# Patient Record
Sex: FEMALE | Race: WHITE | NOT HISPANIC OR LATINO | Employment: OTHER | URBAN - METROPOLITAN AREA
[De-identification: names, ages, dates, MRNs, and addresses within clinical notes are randomized per-mention and may not be internally consistent; named-entity substitution may affect disease eponyms.]

---

## 2017-01-18 ENCOUNTER — FOLLOW UP (OUTPATIENT)
Dept: URBAN - METROPOLITAN AREA CLINIC 26 | Facility: CLINIC | Age: 72
End: 2017-01-18

## 2017-01-18 VITALS — DIASTOLIC BLOOD PRESSURE: 77 MMHG | HEART RATE: 62 BPM | SYSTOLIC BLOOD PRESSURE: 138 MMHG | HEIGHT: 55 IN

## 2017-01-18 DIAGNOSIS — E10.9: ICD-10-CM

## 2017-01-18 DIAGNOSIS — H35.3132: ICD-10-CM

## 2017-01-18 DIAGNOSIS — H02.833: ICD-10-CM

## 2017-01-18 DIAGNOSIS — B58.01: ICD-10-CM

## 2017-01-18 DIAGNOSIS — H43.393: ICD-10-CM

## 2017-01-18 DIAGNOSIS — H02.836: ICD-10-CM

## 2017-01-18 DIAGNOSIS — H04.123: ICD-10-CM

## 2017-01-18 DIAGNOSIS — H31.013: ICD-10-CM

## 2017-01-18 DIAGNOSIS — H35.373: ICD-10-CM

## 2017-01-18 PROCEDURE — 2022F DILAT RTA XM EVC RTNOPTHY: CPT

## 2017-01-18 PROCEDURE — 2019F DILATED MACUL EXAM DONE: CPT

## 2017-01-18 PROCEDURE — 92014 COMPRE OPH EXAM EST PT 1/>: CPT

## 2017-01-18 PROCEDURE — 92250 FUNDUS PHOTOGRAPHY W/I&R: CPT

## 2017-01-18 PROCEDURE — 1036F TOBACCO NON-USER: CPT

## 2017-01-18 PROCEDURE — G8420 CALC BMI NORM PARAMETERS: HCPCS

## 2017-01-18 PROCEDURE — 4177F TALK PT/CRGVR RE AREDS PREV: CPT

## 2017-01-18 ASSESSMENT — TONOMETRY
OD_IOP_MMHG: 13
OS_IOP_MMHG: 14

## 2017-01-18 ASSESSMENT — VISUAL ACUITY
OS_SC: 20/30-2
OD_PH: 20/70
OD_SC: 20/200

## 2018-01-09 ENCOUNTER — FOLLOW UP (OUTPATIENT)
Dept: URBAN - METROPOLITAN AREA CLINIC 26 | Facility: CLINIC | Age: 73
End: 2018-01-09

## 2018-01-09 VITALS — DIASTOLIC BLOOD PRESSURE: 67 MMHG | HEART RATE: 70 BPM | SYSTOLIC BLOOD PRESSURE: 129 MMHG | HEIGHT: 55 IN

## 2018-01-09 DIAGNOSIS — H04.123: ICD-10-CM

## 2018-01-09 DIAGNOSIS — H02.833: ICD-10-CM

## 2018-01-09 DIAGNOSIS — H43.393: ICD-10-CM

## 2018-01-09 DIAGNOSIS — E10.9: ICD-10-CM

## 2018-01-09 DIAGNOSIS — B58.01: ICD-10-CM

## 2018-01-09 DIAGNOSIS — H35.373: ICD-10-CM

## 2018-01-09 DIAGNOSIS — H35.3132: ICD-10-CM

## 2018-01-09 DIAGNOSIS — H02.836: ICD-10-CM

## 2018-01-09 DIAGNOSIS — H31.013: ICD-10-CM

## 2018-01-09 PROCEDURE — 92014 COMPRE OPH EXAM EST PT 1/>: CPT

## 2018-01-09 PROCEDURE — 92134 CPTRZ OPH DX IMG PST SGM RTA: CPT

## 2018-01-09 ASSESSMENT — TONOMETRY
OS_IOP_MMHG: 16
OD_IOP_MMHG: 12

## 2018-01-09 ASSESSMENT — VISUAL ACUITY
OD_CC: 20/30
OS_CC: 20/20

## 2019-01-14 ENCOUNTER — FOLLOW UP (OUTPATIENT)
Dept: URBAN - METROPOLITAN AREA CLINIC 26 | Facility: CLINIC | Age: 74
End: 2019-01-14

## 2019-01-14 VITALS
HEART RATE: 70 BPM | BODY MASS INDEX: 20.49 KG/M2 | WEIGHT: 120 LBS | DIASTOLIC BLOOD PRESSURE: 71 MMHG | SYSTOLIC BLOOD PRESSURE: 129 MMHG | HEIGHT: 64 IN

## 2019-01-14 DIAGNOSIS — H35.373: ICD-10-CM

## 2019-01-14 DIAGNOSIS — E10.9: ICD-10-CM

## 2019-01-14 DIAGNOSIS — B58.01: ICD-10-CM

## 2019-01-14 DIAGNOSIS — H31.013: ICD-10-CM

## 2019-01-14 DIAGNOSIS — H35.3132: ICD-10-CM

## 2019-01-14 DIAGNOSIS — H02.836: ICD-10-CM

## 2019-01-14 DIAGNOSIS — H04.123: ICD-10-CM

## 2019-01-14 DIAGNOSIS — H43.393: ICD-10-CM

## 2019-01-14 DIAGNOSIS — H02.833: ICD-10-CM

## 2019-01-14 PROCEDURE — 92014 COMPRE OPH EXAM EST PT 1/>: CPT

## 2019-01-14 PROCEDURE — 92250 FUNDUS PHOTOGRAPHY W/I&R: CPT

## 2019-01-14 PROCEDURE — 92134 CPTRZ OPH DX IMG PST SGM RTA: CPT

## 2019-01-14 RX ORDER — OLOPATADINE HYDROCHLORIDE 2 MG/ML: 1 SOLUTION OPHTHALMIC ONCE A DAY

## 2019-01-14 ASSESSMENT — VISUAL ACUITY
OD_CC: 20/50+2
OS_CC: 20/20-1

## 2019-01-14 ASSESSMENT — TONOMETRY
OS_IOP_MMHG: 15
OD_IOP_MMHG: 18

## 2019-12-04 ENCOUNTER — FOLLOW UP (OUTPATIENT)
Dept: URBAN - METROPOLITAN AREA CLINIC 26 | Facility: CLINIC | Age: 74
End: 2019-12-04

## 2019-12-04 DIAGNOSIS — H35.373: ICD-10-CM

## 2019-12-04 DIAGNOSIS — H43.393: ICD-10-CM

## 2019-12-04 DIAGNOSIS — B58.01: ICD-10-CM

## 2019-12-04 DIAGNOSIS — H04.123: ICD-10-CM

## 2019-12-04 DIAGNOSIS — H02.836: ICD-10-CM

## 2019-12-04 DIAGNOSIS — H02.833: ICD-10-CM

## 2019-12-04 DIAGNOSIS — H35.3132: ICD-10-CM

## 2019-12-04 DIAGNOSIS — H31.013: ICD-10-CM

## 2019-12-04 DIAGNOSIS — E10.9: ICD-10-CM

## 2019-12-04 PROCEDURE — 92250 FUNDUS PHOTOGRAPHY W/I&R: CPT

## 2019-12-04 PROCEDURE — 92014 COMPRE OPH EXAM EST PT 1/>: CPT

## 2019-12-04 PROCEDURE — 92134 CPTRZ OPH DX IMG PST SGM RTA: CPT

## 2019-12-04 ASSESSMENT — TONOMETRY
OS_IOP_MMHG: 16
OD_IOP_MMHG: 15

## 2019-12-04 ASSESSMENT — VISUAL ACUITY
OD_CC: 20/30-1
OS_CC: 20/25+2

## 2020-10-19 ENCOUNTER — FOLLOW UP (OUTPATIENT)
Dept: URBAN - METROPOLITAN AREA CLINIC 26 | Facility: CLINIC | Age: 75
End: 2020-10-19

## 2020-10-19 VITALS — HEIGHT: 64 IN | WEIGHT: 127 LBS | BODY MASS INDEX: 21.68 KG/M2

## 2020-10-19 DIAGNOSIS — H31.013: ICD-10-CM

## 2020-10-19 DIAGNOSIS — H02.833: ICD-10-CM

## 2020-10-19 DIAGNOSIS — H02.836: ICD-10-CM

## 2020-10-19 DIAGNOSIS — H35.3132: ICD-10-CM

## 2020-10-19 DIAGNOSIS — H43.393: ICD-10-CM

## 2020-10-19 DIAGNOSIS — E10.3592: ICD-10-CM

## 2020-10-19 DIAGNOSIS — B58.01: ICD-10-CM

## 2020-10-19 DIAGNOSIS — H35.373: ICD-10-CM

## 2020-10-19 DIAGNOSIS — H04.123: ICD-10-CM

## 2020-10-19 PROCEDURE — 92250 FUNDUS PHOTOGRAPHY W/I&R: CPT

## 2020-10-19 PROCEDURE — 67028 INJECTION EYE DRUG: CPT

## 2020-10-19 PROCEDURE — 92134 CPTRZ OPH DX IMG PST SGM RTA: CPT

## 2020-10-19 PROCEDURE — 92014 COMPRE OPH EXAM EST PT 1/>: CPT

## 2020-10-19 ASSESSMENT — TONOMETRY
OS_IOP_MMHG: 15
OD_IOP_MMHG: 16

## 2020-10-19 ASSESSMENT — VISUAL ACUITY
OD_CC: 20/30
OS_CC: 20/20-2

## 2020-11-18 ENCOUNTER — CLINICAL PROCEDURE AND DIAGNOSTIC TESTING ONLY (OUTPATIENT)
Dept: URBAN - METROPOLITAN AREA CLINIC 26 | Facility: CLINIC | Age: 75
End: 2020-11-18

## 2020-11-18 DIAGNOSIS — H35.3132: ICD-10-CM

## 2020-11-18 DIAGNOSIS — E10.3592: ICD-10-CM

## 2020-11-18 PROCEDURE — 92250 FUNDUS PHOTOGRAPHY W/I&R: CPT

## 2020-11-18 PROCEDURE — 67028 INJECTION EYE DRUG: CPT

## 2020-11-18 ASSESSMENT — TONOMETRY
OS_IOP_MMHG: 13
OD_IOP_MMHG: 16

## 2020-11-18 ASSESSMENT — VISUAL ACUITY: OS_SC: 20/20-2

## 2020-12-30 ENCOUNTER — FOLLOW UP AND POST INJECTION EVALUATION (OUTPATIENT)
Dept: URBAN - METROPOLITAN AREA CLINIC 26 | Facility: CLINIC | Age: 75
End: 2020-12-30

## 2020-12-30 DIAGNOSIS — H35.373: ICD-10-CM

## 2020-12-30 DIAGNOSIS — B58.01: ICD-10-CM

## 2020-12-30 DIAGNOSIS — E10.3592: ICD-10-CM

## 2020-12-30 DIAGNOSIS — H31.013: ICD-10-CM

## 2020-12-30 DIAGNOSIS — H04.123: ICD-10-CM

## 2020-12-30 DIAGNOSIS — H02.833: ICD-10-CM

## 2020-12-30 DIAGNOSIS — H02.836: ICD-10-CM

## 2020-12-30 DIAGNOSIS — H35.3132: ICD-10-CM

## 2020-12-30 DIAGNOSIS — H43.393: ICD-10-CM

## 2020-12-30 PROCEDURE — 67028 INJECTION EYE DRUG: CPT

## 2020-12-30 PROCEDURE — 92250 FUNDUS PHOTOGRAPHY W/I&R: CPT

## 2020-12-30 PROCEDURE — 92134 CPTRZ OPH DX IMG PST SGM RTA: CPT

## 2020-12-30 PROCEDURE — 92014 COMPRE OPH EXAM EST PT 1/>: CPT

## 2020-12-30 ASSESSMENT — TONOMETRY
OS_IOP_MMHG: 14
OD_IOP_MMHG: 15

## 2020-12-30 ASSESSMENT — VISUAL ACUITY
OS_CC: 20/20
OD_CC: 20/40

## 2021-01-04 ENCOUNTER — CLINIC PROCEDURE ONLY (OUTPATIENT)
Dept: URBAN - METROPOLITAN AREA CLINIC 26 | Facility: CLINIC | Age: 76
End: 2021-01-04

## 2021-01-04 DIAGNOSIS — E10.3592: ICD-10-CM

## 2021-01-04 PROCEDURE — 67228 TREATMENT X10SV RETINOPATHY: CPT

## 2021-01-04 ASSESSMENT — TONOMETRY: OS_IOP_MMHG: 11

## 2021-01-04 ASSESSMENT — VISUAL ACUITY: OS_CC: 20/20-2

## 2021-01-05 ENCOUNTER — UNSCHEDULED FOLLOW UP (OUTPATIENT)
Dept: URBAN - METROPOLITAN AREA CLINIC 26 | Facility: CLINIC | Age: 76
End: 2021-01-05

## 2021-01-05 VITALS — WEIGHT: 128 LBS | HEIGHT: 64 IN | BODY MASS INDEX: 21.85 KG/M2

## 2021-01-05 DIAGNOSIS — B58.01: ICD-10-CM

## 2021-01-05 DIAGNOSIS — H35.3132: ICD-10-CM

## 2021-01-05 DIAGNOSIS — S05.02XA: ICD-10-CM

## 2021-01-05 DIAGNOSIS — H31.013: ICD-10-CM

## 2021-01-05 DIAGNOSIS — H35.373: ICD-10-CM

## 2021-01-05 DIAGNOSIS — E10.3592: ICD-10-CM

## 2021-01-05 PROCEDURE — 92071 CONTACT LENS FITTING FOR TX: CPT

## 2021-01-05 PROCEDURE — 92012 INTRM OPH EXAM EST PATIENT: CPT

## 2021-01-05 RX ORDER — OFLOXACIN 3 MG/ML: 1 SOLUTION/ DROPS OPHTHALMIC

## 2021-01-05 ASSESSMENT — TONOMETRY
OS_IOP_MMHG: 10
OD_IOP_MMHG: 15

## 2021-01-05 ASSESSMENT — VISUAL ACUITY
OS_SC: 20/25
OD_SC: 20/60-2

## 2021-01-07 ENCOUNTER — CLINIC PROCEDURE ONLY (OUTPATIENT)
Dept: URBAN - METROPOLITAN AREA CLINIC 26 | Facility: CLINIC | Age: 76
End: 2021-01-07

## 2021-01-07 DIAGNOSIS — H35.373: ICD-10-CM

## 2021-01-07 DIAGNOSIS — H35.3132: ICD-10-CM

## 2021-01-07 DIAGNOSIS — E10.3592: ICD-10-CM

## 2021-01-07 DIAGNOSIS — B58.01: ICD-10-CM

## 2021-01-07 DIAGNOSIS — H31.013: ICD-10-CM

## 2021-01-07 DIAGNOSIS — H43.393: ICD-10-CM

## 2021-01-07 DIAGNOSIS — S05.02XA: ICD-10-CM

## 2021-01-07 PROCEDURE — 92012 INTRM OPH EXAM EST PATIENT: CPT

## 2021-01-07 ASSESSMENT — VISUAL ACUITY: OS_CC: 20/20

## 2022-07-09 ENCOUNTER — TELEPHONE ENCOUNTER (OUTPATIENT)
Dept: URBAN - METROPOLITAN AREA CLINIC 121 | Facility: CLINIC | Age: 77
End: 2022-07-09

## 2022-07-10 ENCOUNTER — TELEPHONE ENCOUNTER (OUTPATIENT)
Dept: URBAN - METROPOLITAN AREA CLINIC 121 | Facility: CLINIC | Age: 77
End: 2022-07-10

## 2022-07-10 RX ORDER — HYDROCODONE BITARTRATE AND ACETAMINOPHEN 325; 10 MG/1; MG/1
TABLET ORAL
Refills: 0 | Status: ACTIVE | COMMUNITY
Start: 2013-01-31

## 2022-07-10 RX ORDER — LOVASTATIN 40 MG/1
TABLET ORAL
Refills: 0 | Status: ACTIVE | COMMUNITY
Start: 2013-01-31

## 2022-07-10 RX ORDER — GABAPENTIN 600 MG
TABLET ORAL
Refills: 0 | Status: ACTIVE | COMMUNITY
Start: 2013-01-31

## 2022-07-10 RX ORDER — CLOPIDOGREL BISULFATE 75 MG
TABLET ORAL
Refills: 0 | Status: ACTIVE | COMMUNITY
Start: 2013-01-31

## 2022-07-10 RX ORDER — UBIDECARENONE/VIT E ACET 100MG-5
CAPSULE ORAL
Refills: 0 | Status: ACTIVE | COMMUNITY
Start: 2013-01-31

## 2022-07-10 RX ORDER — LEVOTHYROXINE SODIUM 100 MCG
TABLET ORAL
Refills: 0 | Status: ACTIVE | COMMUNITY
Start: 2013-01-31

## 2022-07-10 RX ORDER — ATENOLOL 25 MG/1
TABLET ORAL
Refills: 0 | Status: ACTIVE | COMMUNITY
Start: 2013-01-31

## 2022-07-10 RX ORDER — ASPIRIN 81 MG/1
TABLET, DELAYED RELEASE ORAL
Refills: 0 | Status: ACTIVE | COMMUNITY
Start: 2013-01-31

## 2022-12-20 NOTE — PATIENT DISCUSSION
12/20/22-Recommend 100 units of Daxxify. Patient elects Daxxify injection. 0units discarded, 100 units used. (discussed risks and benefits of Daxy. Darrin Arroyo ). Lot# [id number], exp. 06/2023.